# Patient Record
Sex: FEMALE | Race: BLACK OR AFRICAN AMERICAN
[De-identification: names, ages, dates, MRNs, and addresses within clinical notes are randomized per-mention and may not be internally consistent; named-entity substitution may affect disease eponyms.]

---

## 2019-03-26 ENCOUNTER — HOSPITAL ENCOUNTER (EMERGENCY)
Dept: HOSPITAL 72 - EMR | Age: 39
Discharge: HOME | End: 2019-03-26
Payer: COMMERCIAL

## 2019-03-26 VITALS — DIASTOLIC BLOOD PRESSURE: 88 MMHG | SYSTOLIC BLOOD PRESSURE: 127 MMHG

## 2019-03-26 VITALS — HEIGHT: 66 IN | WEIGHT: 183 LBS | BODY MASS INDEX: 29.41 KG/M2

## 2019-03-26 VITALS — SYSTOLIC BLOOD PRESSURE: 131 MMHG | DIASTOLIC BLOOD PRESSURE: 92 MMHG

## 2019-03-26 DIAGNOSIS — J45.909: ICD-10-CM

## 2019-03-26 DIAGNOSIS — S05.02XA: ICD-10-CM

## 2019-03-26 DIAGNOSIS — Z91.09: ICD-10-CM
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
## 2019-03-26 DIAGNOSIS — S39.012A: ICD-10-CM

## 2019-03-26 DIAGNOSIS — S63.501A: Primary | ICD-10-CM
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
## 2019-03-26 PROCEDURE — 99283 EMERGENCY DEPT VISIT LOW MDM: CPT

## 2019-03-26 PROCEDURE — 29125 APPL SHORT ARM SPLINT STATIC: CPT

## 2019-03-26 NOTE — NUR
ED Nurse Note:pt denies injury states having lower bakc pain. also relates 
frequent tearing up to left eye without drainage but does feel itching at 
night.  no dyspnea

## 2019-03-26 NOTE — NUR
ER DISCHARGE NOTE:

Patient is cleared to be discharged per ERMD, pt is aox4, on room air, with 
stable vital signs. pt was given dc and prescription instructions, pt was able 
to verbalize understanding, pt id band removed. pt is able to ambulate with 
steady gait. pt took all belongings.

## 2019-03-26 NOTE — EMERGENCY ROOM REPORT
History of Present Illness


General


Chief Complaint:  Pain


Source:  Medical Record





Present Illness


HPI


38-year-old female presents to the emergency department complaining of 8 out of 

10 in severity pain to the dorsum of the right wrist, the low back which 

radiates across the low back as well as the right knee.  Patient reports that 

she has been training and her symptoms have been progressive.  Patient denies 

appreciable trauma or fall.  Denies erythema, swelling or warmth of the joints.

  Patient reports that she is right-hand dominant. Denies numbness tingling or 

loss of sensation or gross motor movements of the extremities, incontinence of 

bowel or bladder. Denies CP, Palpitations, LOC, AMS, dizziness, Changes in 

Vision, weakness or a sudden severe headache.   Pt. also c/o scratching FB 

sensation in the left eye with increased lacrimation x 3 days. Denies: Pain, 

Discharge, Redness, Loss of vision, Floaters, Flashing lights, Diplopia/blurry 

vision. Denies contact lens use.


Allergies:  


Coded Allergies:  


     No Known Allergies (Unverified , 2/18/13)





Patient History


Past Medical History:  see triage record


Past Surgical History:  none


Pertinent Family History:  none


Last Menstrual Period:  02/26/19


Pregnant Now:  No


Reviewed Nursing Documentation:  PMH: Agreed





Nursing Documentation-PMH


Past Medical History:  No History, Except For


Hx Asthma:  Yes





Review of Systems


All Other Systems:  negative except mentioned in HPI





Physical Exam





Vital Signs








  Date Time  Temp Pulse Resp B/P (MAP) Pulse Ox O2 Delivery O2 Flow Rate FiO2


 


3/26/19 12:01 98.2 66 18 131/92 96 Room Air  








Sp02 EP Interpretation:  reviewed, normal


General Appearance:  no apparent distress, alert, GCS 15, non-toxic


Head:  normocephalic, atraumatic


Eyes:  left eye fluoroscene uptake - in the 6 o clock position; bilateral eye 

normal inspection, bilateral eye PERRL, bilateral eye EOMI


ENT:  hearing grossly normal, normal voice


Neck:  full range of motion


Respiratory:  lungs clear, normal breath sounds, speaking full sentences


Cardiovascular #1:  regular rate, rhythm


Musculoskeletal:  back normal, gait/station normal, normal range of motion, 

tender - Dorsum of the right wrist, FROM , no bony ttp, TTP to the lumbar 

paraspinal muscles, no midline ttp. FROm of the right knee, no instability or 

laxity of the ligaments.


Neurologic:  alert, oriented x3, responsive, motor strength/tone normal, 

sensory intact, speech normal, grossly normal


Psychiatric:  judgement/insight normal


Skin:  normal color, no rash, warm/dry, well hydrated





Medical Decision Making


PA Attestation


Dr. Carbajal is my supervising physician whom pt. management has been discussed 

with.


Diagnostic Impression:  


 Primary Impression:  


 Right wrist sprain


 Qualified Codes:  S63.501A - Unspecified sprain of right wrist, initial 

encounter


 Additional Impressions:  


 Low back strain


 Qualified Codes:  S39.012A - Strain of muscle, fascia and tendon of lower back

, initial encounter


 Corneal abrasion, left


 Qualified Codes:  S05.02XA - Injury of conjunctiva and corneal abrasion 

without foreign body, left eye, initial encounter


 Multiple allergies


ER Course


38-year-old female presents to the emergency department complaining of 8 out of 

10 in severity pain to the dorsum of the right wrist, the low back which 

radiates across the low back as well as the left knee.  Patient reports that 

she has been training and her symptoms have been progressive.  Patient denies 

appreciable trauma or fall.  Denies erythema, swelling or warmth of the joints.

  Patient reports that she is right-hand dominant. Denies numbness tingling or 

loss of sensation or gross motor movements of the extremities, incontinence of 

bowel or bladder. Denies CP, Palpitations, LOC, AMS, dizziness, Changes in 

Vision, weakness or a sudden severe headache.   Pt. also c/o scratching FB 

sensation in the left eye with increased lacrimation x 3 days. Denies: Pain, 

Discharge, Redness, Loss of vision, Floaters, Flashing lights, Diplopia/blurry 

vision. Denies contact lens use.  





Ddx considered but are not limited to Fracture, dislocation, contusion,  Sprain/

Strain/Spasm, corneal abrasion, conjunctivitis, eye FB, or allergies  just to 

name a few. 





Vital signs: are WNL, pt. is afebrile


H&PE are most consistent with Soft-tissue musculoskeletal injury that does not 

require xray imaging at this time. also suspect eye fb or abrasion will do in 

depth eye exam with stain. 





ORDERS:





None:  X-ray 's not required no localized bony ttp, symptoms are located in the 

musculature





ED INTERVENTIONS:





-  Motrin and Benadryl PO


-Lidoderm TP





-Right WRIST Splint applied  by ED tech. Pt. remains neurovascularly intact. 








DISCHARGE: At this time pt. is stable for d/c to home. Will provide printed 

patient care instructions, and any necessary prescriptions. Care plan and 

follow up instructions have been discussed with the patient prior to discharge.





Last Vital Signs








  Date Time  Temp Pulse Resp B/P (MAP) Pulse Ox O2 Delivery O2 Flow Rate FiO2


 


3/26/19 12:22 98.2 76 18 131/92 96 Room Air  








Disposition:  HOME, SELF-CARE


Condition:  Stable


Scripts


Methocarbamol* (ROBAXIN-750*) 750 Mg Tablet


750 MG PO TID for 7 Days, #21 TAB 0 Refills


   Prov: Cyndy Joyner         3/26/19 


Diphenhydramine Hcl (BENADRYL ALLERGY) 25 Mg Tablet


25 MG PO Q6HR, #30 TAB


   Prov: Cyndy Joyner         3/26/19 


Olopatadine Hcl (PATADAY) 2.5 Ml Drops


1 DRP OP DAILY, #2.5 ML


   Prov: Cyndy Joyner         3/26/19 


Ofloxacin (OCUFLOX) 5 Ml Drops


1 DROP OP TID for 5 Days, #5 ML


   Prov: Cyndy Joyner         3/26/19


Patient Instructions:  Corneal Abrasion, Easy-to-Read, Wrist Sprain





Additional Instructions:  


Take medications as directed. 


 ** Follow up with a Primary Care Provider in 3-5 days, even if your symptoms 

have resolved. ** 





**OPHTHALMOLOGY FOLLOW UP IN 48 HOURS *


--Please review list of primary care clinics, if you do not already have a 

primary care provider





Return sooner to ED if new symptoms occur, or current symptoms become worse. 


Do not drink alcohol, drive, or operate heavy machinery while taking Robaxin ( 

Muscle Relaxers) as this may cause drowsiness. 











- Please note that this Emergency Department Report was dictated using [employer] technology software, occasionally this can lead to 

erroneous entry secondary to interpretation by the dictation equipment.











Cyndy Joyner Mar 26, 2019 12:53

## 2019-08-30 ENCOUNTER — HOSPITAL ENCOUNTER (EMERGENCY)
Dept: HOSPITAL 72 - EMR | Age: 39
Discharge: HOME | End: 2019-08-30
Payer: MEDICAID

## 2019-08-30 VITALS — WEIGHT: 172 LBS | HEIGHT: 66 IN | BODY MASS INDEX: 27.64 KG/M2

## 2019-08-30 VITALS — DIASTOLIC BLOOD PRESSURE: 98 MMHG | SYSTOLIC BLOOD PRESSURE: 143 MMHG

## 2019-08-30 DIAGNOSIS — S49.91XA: Primary | ICD-10-CM

## 2019-08-30 DIAGNOSIS — J45.909: ICD-10-CM
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
## 2019-08-30 PROCEDURE — 99283 EMERGENCY DEPT VISIT LOW MDM: CPT

## 2019-08-30 PROCEDURE — 29105 APPLICATION LONG ARM SPLINT: CPT

## 2019-08-30 NOTE — DIAGNOSTIC IMAGING REPORT
Indication: Right shoulder pain

 

COMPARISON: None

 

Findings: 3  views of the right shoulder were obtained. 

 

No acute fractures, malalignment, erosions or periostitis are identified. Soft

tissues are unremarkable.

 

Impression: Negative for acute injury

## 2019-08-30 NOTE — NUR
ER DISCHARGE NOTE:

Patient is cleared to be discharged per ERMD, Patient is aox4, on room air, 
with stable vital signs. Patient was given dc and prescription instructions. 
Patient was able to verbalize understanding, pt id band removed. Patient is 
able to ambulate with steady gait. Patient took all belongings.